# Patient Record
Sex: FEMALE | Race: BLACK OR AFRICAN AMERICAN | NOT HISPANIC OR LATINO | URBAN - METROPOLITAN AREA
[De-identification: names, ages, dates, MRNs, and addresses within clinical notes are randomized per-mention and may not be internally consistent; named-entity substitution may affect disease eponyms.]

---

## 2017-06-22 ENCOUNTER — COMMUNICATION - HEALTHEAST (OUTPATIENT)
Dept: SURGERY | Facility: CLINIC | Age: 57
End: 2017-06-22

## 2017-07-20 ENCOUNTER — COMMUNICATION - HEALTHEAST (OUTPATIENT)
Dept: SURGERY | Facility: CLINIC | Age: 57
End: 2017-07-20

## 2017-07-20 ENCOUNTER — AMBULATORY - HEALTHEAST (OUTPATIENT)
Dept: LAB | Facility: CLINIC | Age: 57
End: 2017-07-20

## 2017-07-20 ENCOUNTER — OFFICE VISIT - HEALTHEAST (OUTPATIENT)
Dept: SURGERY | Facility: CLINIC | Age: 57
End: 2017-07-20

## 2017-07-20 DIAGNOSIS — E66.01 MORBID OBESITY (H): ICD-10-CM

## 2017-07-20 DIAGNOSIS — R53.83 FATIGUE: ICD-10-CM

## 2017-07-20 DIAGNOSIS — E78.5 DYSLIPIDEMIA: ICD-10-CM

## 2017-07-20 DIAGNOSIS — M19.019 OSTEOARTHRITIS OF SHOULDER: ICD-10-CM

## 2017-07-20 LAB
CHOLEST SERPL-MCNC: 232 MG/DL
FASTING STATUS PATIENT QL REPORTED: YES
HBA1C MFR BLD: 5.6 % (ref 4.2–6.1)
HDLC SERPL-MCNC: 58 MG/DL
LDLC SERPL CALC-MCNC: 156 MG/DL
TRIGL SERPL-MCNC: 92 MG/DL

## 2017-07-20 RX ORDER — IRON ASPGLY/C/B12/FA/CA-TH/SUC 70-150-1MG
1 TABLET ORAL DAILY
Status: SHIPPED | COMMUNITY
Start: 2017-07-20

## 2017-07-20 ASSESSMENT — MIFFLIN-ST. JEOR: SCORE: 1505.76

## 2017-10-26 ENCOUNTER — AMBULATORY - HEALTHEAST (OUTPATIENT)
Dept: PHYSICAL THERAPY | Facility: REHABILITATION | Age: 57
End: 2017-10-26

## 2017-10-26 DIAGNOSIS — M54.2 CERVICALGIA: ICD-10-CM

## 2018-04-27 ENCOUNTER — COMMUNICATION - HEALTHEAST (OUTPATIENT)
Dept: SURGERY | Facility: CLINIC | Age: 58
End: 2018-04-27

## 2018-08-16 ENCOUNTER — COMMUNICATION - HEALTHEAST (OUTPATIENT)
Dept: SURGERY | Facility: CLINIC | Age: 58
End: 2018-08-16

## 2020-02-18 ENCOUNTER — AMBULATORY - HEALTHEAST (OUTPATIENT)
Dept: LAB | Facility: CLINIC | Age: 60
End: 2020-02-18

## 2020-02-18 ENCOUNTER — OFFICE VISIT - HEALTHEAST (OUTPATIENT)
Dept: SURGERY | Facility: CLINIC | Age: 60
End: 2020-02-18

## 2020-02-18 DIAGNOSIS — E66.812 CLASS 2 OBESITY WITH BODY MASS INDEX (BMI) OF 38.0 TO 38.9 IN ADULT, UNSPECIFIED OBESITY TYPE, UNSPECIFIED WHETHER SERIOUS COMORBIDITY PRESENT: ICD-10-CM

## 2020-02-18 DIAGNOSIS — E78.5 DYSLIPIDEMIA: ICD-10-CM

## 2020-02-18 LAB
25(OH)D3 SERPL-MCNC: 21.1 NG/ML (ref 30–80)
ALBUMIN SERPL-MCNC: 3.7 G/DL (ref 3.5–5)
ALP SERPL-CCNC: 70 U/L (ref 45–120)
ALT SERPL W P-5'-P-CCNC: 24 U/L (ref 0–45)
ANION GAP SERPL CALCULATED.3IONS-SCNC: 8 MMOL/L (ref 5–18)
AST SERPL W P-5'-P-CCNC: 18 U/L (ref 0–40)
BILIRUB SERPL-MCNC: 0.4 MG/DL (ref 0–1)
BUN SERPL-MCNC: 15 MG/DL (ref 8–22)
CALCIUM SERPL-MCNC: 9.3 MG/DL (ref 8.5–10.5)
CHLORIDE BLD-SCNC: 106 MMOL/L (ref 98–107)
CHOLEST SERPL-MCNC: 232 MG/DL
CO2 SERPL-SCNC: 25 MMOL/L (ref 22–31)
CREAT SERPL-MCNC: 0.7 MG/DL (ref 0.6–1.1)
ERYTHROCYTE [DISTWIDTH] IN BLOOD BY AUTOMATED COUNT: 12.6 % (ref 11–14.5)
FASTING STATUS PATIENT QL REPORTED: YES
GFR SERPL CREATININE-BSD FRML MDRD: >60 ML/MIN/1.73M2
GLUCOSE BLD-MCNC: 104 MG/DL (ref 70–125)
HBA1C MFR BLD: 5.5 % (ref 4.2–6.1)
HCT VFR BLD AUTO: 41.7 % (ref 35–47)
HDLC SERPL-MCNC: 65 MG/DL
HGB BLD-MCNC: 13.5 G/DL (ref 12–16)
LDLC SERPL CALC-MCNC: 143 MG/DL
MCH RBC QN AUTO: 29.9 PG (ref 27–34)
MCHC RBC AUTO-ENTMCNC: 32.4 G/DL (ref 32–36)
MCV RBC AUTO: 93 FL (ref 80–100)
PLATELET # BLD AUTO: 288 THOU/UL (ref 140–440)
PMV BLD AUTO: 9.5 FL (ref 8.5–12.5)
POTASSIUM BLD-SCNC: 4.4 MMOL/L (ref 3.5–5)
PROT SERPL-MCNC: 6.6 G/DL (ref 6–8)
PTH-INTACT SERPL-MCNC: 119 PG/ML (ref 10–86)
RBC # BLD AUTO: 4.51 MILL/UL (ref 3.8–5.4)
SODIUM SERPL-SCNC: 139 MMOL/L (ref 136–145)
TRIGL SERPL-MCNC: 119 MG/DL
TSH SERPL DL<=0.005 MIU/L-ACNC: 0.8 UIU/ML (ref 0.3–5)
VIT B12 SERPL-MCNC: 794 PG/ML (ref 213–816)
WBC: 4 THOU/UL (ref 4–11)

## 2020-02-18 RX ORDER — CHLORHEXIDINE GLUCONATE ORAL RINSE 1.2 MG/ML
SOLUTION DENTAL
Status: SHIPPED | COMMUNITY
Start: 2020-02-06

## 2020-02-18 ASSESSMENT — MIFFLIN-ST. JEOR: SCORE: 1532.97

## 2020-02-20 LAB — VIT B1 PYROPHOSHATE BLD-SCNC: 71 NMOL/L (ref 70–180)

## 2020-02-25 ENCOUNTER — COMMUNICATION - HEALTHEAST (OUTPATIENT)
Dept: SURGERY | Facility: CLINIC | Age: 60
End: 2020-02-25

## 2020-02-25 ENCOUNTER — AMBULATORY - HEALTHEAST (OUTPATIENT)
Dept: SURGERY | Facility: CLINIC | Age: 60
End: 2020-02-25

## 2020-02-25 DIAGNOSIS — R79.89 INCREASED PTH LEVEL: ICD-10-CM

## 2020-02-25 DIAGNOSIS — R79.89 LOW SERUM VITAMIN D: ICD-10-CM

## 2020-09-01 ENCOUNTER — COMMUNICATION - HEALTHEAST (OUTPATIENT)
Dept: SURGERY | Facility: CLINIC | Age: 60
End: 2020-09-01

## 2020-09-01 DIAGNOSIS — R79.89 LOW SERUM VITAMIN D: ICD-10-CM

## 2020-09-01 DIAGNOSIS — R79.89 INCREASED PTH LEVEL: ICD-10-CM

## 2020-09-04 ENCOUNTER — COMMUNICATION - HEALTHEAST (OUTPATIENT)
Dept: SURGERY | Facility: CLINIC | Age: 60
End: 2020-09-04

## 2020-09-08 ENCOUNTER — COMMUNICATION - HEALTHEAST (OUTPATIENT)
Dept: SURGERY | Facility: CLINIC | Age: 60
End: 2020-09-08

## 2020-09-08 ENCOUNTER — AMBULATORY - HEALTHEAST (OUTPATIENT)
Dept: LAB | Facility: CLINIC | Age: 60
End: 2020-09-08

## 2020-09-08 DIAGNOSIS — R79.89 LOW SERUM VITAMIN D: ICD-10-CM

## 2020-09-08 DIAGNOSIS — R79.89 INCREASED PTH LEVEL: ICD-10-CM

## 2020-09-08 LAB
25(OH)D3 SERPL-MCNC: 31.8 NG/ML (ref 30–80)
ALBUMIN SERPL-MCNC: 3.7 G/DL (ref 3.5–5)
ALP SERPL-CCNC: 68 U/L (ref 45–120)
ALT SERPL W P-5'-P-CCNC: 33 U/L (ref 0–45)
ANION GAP SERPL CALCULATED.3IONS-SCNC: 9 MMOL/L (ref 5–18)
AST SERPL W P-5'-P-CCNC: 22 U/L (ref 0–40)
BILIRUB SERPL-MCNC: 0.6 MG/DL (ref 0–1)
BUN SERPL-MCNC: 14 MG/DL (ref 8–22)
CALCIUM SERPL-MCNC: 9.2 MG/DL (ref 8.5–10.5)
CHLORIDE BLD-SCNC: 106 MMOL/L (ref 98–107)
CO2 SERPL-SCNC: 23 MMOL/L (ref 22–31)
CREAT SERPL-MCNC: 0.71 MG/DL (ref 0.6–1.1)
GFR SERPL CREATININE-BSD FRML MDRD: >60 ML/MIN/1.73M2
GLUCOSE BLD-MCNC: 110 MG/DL (ref 70–125)
POTASSIUM BLD-SCNC: 4.5 MMOL/L (ref 3.5–5)
PROT SERPL-MCNC: 6.9 G/DL (ref 6–8)
PTH-INTACT SERPL-MCNC: 80 PG/ML (ref 10–86)
SODIUM SERPL-SCNC: 138 MMOL/L (ref 136–145)

## 2020-09-09 ENCOUNTER — COMMUNICATION - HEALTHEAST (OUTPATIENT)
Dept: SURGERY | Facility: CLINIC | Age: 60
End: 2020-09-09

## 2020-09-15 ENCOUNTER — COMMUNICATION - HEALTHEAST (OUTPATIENT)
Dept: SURGERY | Facility: CLINIC | Age: 60
End: 2020-09-15

## 2020-09-15 ENCOUNTER — OFFICE VISIT - HEALTHEAST (OUTPATIENT)
Dept: SURGERY | Facility: CLINIC | Age: 60
End: 2020-09-15

## 2020-09-15 DIAGNOSIS — E66.9 OBESITY (BMI 30-39.9): ICD-10-CM

## 2020-09-15 ASSESSMENT — MIFFLIN-ST. JEOR: SCORE: 1478.54

## 2020-09-16 ENCOUNTER — AMBULATORY - HEALTHEAST (OUTPATIENT)
Dept: SURGERY | Facility: CLINIC | Age: 60
End: 2020-09-16

## 2020-09-16 DIAGNOSIS — E66.812 CLASS 2 OBESITY WITH BODY MASS INDEX (BMI) OF 38.0 TO 38.9 IN ADULT, UNSPECIFIED OBESITY TYPE, UNSPECIFIED WHETHER SERIOUS COMORBIDITY PRESENT: ICD-10-CM

## 2020-09-16 DIAGNOSIS — E66.01 MORBID OBESITY (H): ICD-10-CM

## 2020-09-16 RX ORDER — PHENTERMINE HYDROCHLORIDE 37.5 MG/1
TABLET ORAL
Qty: 90 TABLET | Refills: 1 | Status: SHIPPED | OUTPATIENT
Start: 2020-09-16

## 2020-10-05 ENCOUNTER — OFFICE VISIT - HEALTHEAST (OUTPATIENT)
Dept: SURGERY | Facility: CLINIC | Age: 60
End: 2020-10-05

## 2020-10-05 DIAGNOSIS — E66.9 OBESITY (BMI 30-39.9): ICD-10-CM

## 2020-12-07 ENCOUNTER — OFFICE VISIT - HEALTHEAST (OUTPATIENT)
Dept: SURGERY | Facility: CLINIC | Age: 60
End: 2020-12-07

## 2020-12-07 DIAGNOSIS — E66.9 OBESITY (BMI 30-39.9): ICD-10-CM

## 2021-02-22 ENCOUNTER — OFFICE VISIT - HEALTHEAST (OUTPATIENT)
Dept: SURGERY | Facility: CLINIC | Age: 61
End: 2021-02-22

## 2021-02-22 DIAGNOSIS — E66.9 OBESITY (BMI 30-39.9): ICD-10-CM

## 2021-02-22 ASSESSMENT — MIFFLIN-ST. JEOR: SCORE: 1478.54

## 2021-05-24 ENCOUNTER — RECORDS - HEALTHEAST (OUTPATIENT)
Dept: ADMINISTRATIVE | Facility: CLINIC | Age: 61
End: 2021-05-24

## 2021-05-26 ENCOUNTER — OFFICE VISIT - HEALTHEAST (OUTPATIENT)
Dept: SURGERY | Facility: CLINIC | Age: 61
End: 2021-05-26

## 2021-05-26 DIAGNOSIS — E66.9 OBESITY (BMI 30-39.9): ICD-10-CM

## 2021-05-27 ENCOUNTER — RECORDS - HEALTHEAST (OUTPATIENT)
Dept: ADMINISTRATIVE | Facility: CLINIC | Age: 61
End: 2021-05-27

## 2021-05-28 ENCOUNTER — RECORDS - HEALTHEAST (OUTPATIENT)
Dept: ADMINISTRATIVE | Facility: CLINIC | Age: 61
End: 2021-05-28

## 2021-05-30 ENCOUNTER — RECORDS - HEALTHEAST (OUTPATIENT)
Dept: ADMINISTRATIVE | Facility: CLINIC | Age: 61
End: 2021-05-30

## 2021-05-31 VITALS — WEIGHT: 212 LBS | BODY MASS INDEX: 37.56 KG/M2 | HEIGHT: 63 IN

## 2021-06-04 VITALS
HEIGHT: 63 IN | RESPIRATION RATE: 14 BRPM | BODY MASS INDEX: 38.62 KG/M2 | WEIGHT: 218 LBS | HEART RATE: 64 BPM | DIASTOLIC BLOOD PRESSURE: 70 MMHG | SYSTOLIC BLOOD PRESSURE: 127 MMHG | OXYGEN SATURATION: 98 %

## 2021-06-04 VITALS — HEIGHT: 63 IN | BODY MASS INDEX: 36.5 KG/M2 | WEIGHT: 206 LBS

## 2021-06-05 VITALS — BODY MASS INDEX: 36.5 KG/M2 | WEIGHT: 206 LBS | HEIGHT: 63 IN

## 2021-06-05 VITALS — BODY MASS INDEX: 36.49 KG/M2 | HEIGHT: 63 IN

## 2021-06-06 NOTE — PATIENT INSTRUCTIONS - HE
Monitor your steps with your Apple watch    [facility]    Remember to:    -Eat 3 meals a day (not 2, not 5) Chew your food well/SLOW down  -Eat your protein first  -Be a water drinker/Minize liquid calories (no regular pop, no juice) skim or 1% milk OK  -Sleep 7-8 hours each night. Address sleep if problematic  -Stress management is important. Address if problematic  -Move-8000 steps daily Muscle: maintain your muscle mass (strength training 2X/wk)  -Wheat, not white (bread, pasta, crackers, jacob, bagels, tortillas, rice)  -Limit restaurant, cafeteria, take out, drive through to 2 times per week or less  -Minimize caffeine, alcohol, and night-time snacking  -Consider keeping a food diary (i.e. My Fitness Pal, Lose It, or other food tracker)  -Follow up with the dietitian      **Some lean proteins: chicken, turkey, tuna, salmon, crab, fish, shrimp, scallops, lobster, lean cuts of beef and pork, luncheon meats, veggie burgers, beans (black, lima, garbanzo, pendleton, kidney, refried), chile, cottage cheese, string cheese, other cheese, eggs, tofu, peanut butter, nuts, vegan crumbles, greek yogurt

## 2021-06-06 NOTE — TELEPHONE ENCOUNTER
Called pt to discuss her lab results and 's recommendations. Let her a message with the info and asked her to read the message from the doctor.    Razia Bledsoe RN, CBN  [facility]  P [phone]  F [phone]

## 2021-06-06 NOTE — PROGRESS NOTES
"Bariatric Follow-up    59 y.o.  female BMI:Body mass index is 38.62 kg/m .    Weight:   Wt Readings from Last 1 Encounters:   02/18/20 218 lb (98.9 kg)    pounds  Height: 5' 3\" (1.6 m) (2/18/2020  8:39 AM)  Weight: 218 lb (98.9 kg) (2/18/2020  8:39 AM)  BMI (Calculated): 38.6 (2/18/2020  8:39 AM)  SpO2: 98 % (2/18/2020  8:39 AM)  Waist Circumference (In): 43.2 Inches (2/18/2020  8:39 AM)  Hip Circumference (In): 52.2 Inches (2/18/2020  8:39 AM)  Neck Circumference (In): 14.75 Inches (2/18/2020  8:39 AM)      Comorbidities:  Patient Active Problem List   Diagnosis     Dyslipidemia     Fatigue     Low back pain     Knee pain     Heartburn     Foot pain     Lower leg edema     Gout     Morbid obesity (H)     Urinary incontinence     Urinary urgency     Osteoarthritis of shoulder       Interim: Has been since 2017 that she followed up. \"Life Happened.\"  Working 2 jobs. New grandbaby with chromosomal abnormality.  Second job [employer] and tries to march in place. First job [employer] sitting at     Plan: Use your apple watch or phone to quantify your steps. Dietitian 1/2 phentermine in the am. [place] 5 minutes. Protein first. Labs todat  -We reviewed her medications and whether associated with weight gain.    We discussed [facility] including:  -eating 3 meals daily  -eating protein first  -eating slowly, chewing food well  -avoiding/limiting calorie containing beverages  -choosing wheat, not white with breads, crackers, pastas, jacob, bagels, tortillas, rice  -limiting restaurant or cafeteria eating to twice a week or less  -We discussed the importance of restorative sleep and stress management in maintaining a healthy weight.  -We discussed insulin resistance and glycemic index as it relates to appetite and weight control  -We discussed the National Weight Control Registry healthy weight maintenance strategies and ways to optimize metabolism.  -We discussed the " importance of physical activity including cardiovascular and strength training in maintaining a healthier weight and explored viable options.    Most recent labs:  Lab Results   Component Value Date    WBC 4.1 07/20/2017    HGB 13.6 07/20/2017    HCT 40.1 07/20/2017    MCV 92 07/20/2017     07/20/2017     Lab Results   Component Value Date    CHOL 232 (H) 07/20/2017     Lab Results   Component Value Date    HDL 58 07/20/2017     Lab Results   Component Value Date    LDLCALC 156 (H) 07/20/2017     Lab Results   Component Value Date    TRIG 92 07/20/2017       Lab Results   Component Value Date    ALT 31 07/20/2017    AST 25 07/20/2017    ALKPHOS 65 07/20/2017    BILITOT 0.8 07/20/2017     Lab Results   Component Value Date    HGBA1C 5.6 07/20/2017       Lab Results   Component Value Date    FERRITIN 112 06/25/2015     Lab Results   Component Value Date    PTH 83 06/25/2015     Lab Results   Component Value Date    25130 116 06/25/2015     Lab Results   Component Value Date    7597 64.9 06/25/2015     Lab Results   Component Value Date    TSH 0.78 06/29/2015       DIETARY HISTORY  Meals Per Day: 2  Eating Protein First?: sometimes  Food Diary: B:eggs L:all over the place, left overs D:at work-fried chicken, veggies  Snacks Per Day: not a lot  Typical Snack: chips  Fluid Intake: intentional  Portion Control: problematic  Calorie Containing Beverages: coffee with creamer and raw sugar, OJ and grapefruit juice  Alcohol per week: no  Typical Protein Food Choices: variety  Choosing Whole Grains: yes-most  Grocery Shopping is done by: herself  Food Preparation is done by: herself  Meals at Restaurant/Cafeteria/Take Out Per Week: 3  Eating at the Table: no  TV is Off During Meals: no    Positive Changes Since Last Visit: ready to get back on track  Struggling With: exercise, time, stress (a little better)    Knowledgeable in Reading Food Labels: yes  Getting Adequate Protein: yes  Sleeping 7-8 hours/day 11pm-6 am  "catches up on Thursdays and weekends  Stress management much better    PHYSICAL ACTIVITY PATTERNS:  Cardiovascular: walking, 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  Strength Training: none    REVIEW OF SYSTEMS  GENERAL/CONSTITUTIONAL:  Fatigue: yes  CARDIOVASCULAR:  Chest Pain with Exertion: no  PULMONARY:  Dyspnea on exertion: yes  CPAP Use: no  Tobacco Use: no  Asthma Controlled: NA  GASTROINTESTINAL:  GERD/Heartburn: no  Gallbladder:   UROLOGIC:  Urinary Symptoms: urgency  NEUROLOGIC:  Headaches: no  Paresthesias: no  PSYCHIATRIC:  Moods: OK  MUSCULOSKELETAL/RHEUMATOLOGIC  Arthralgias: knee  Myalgias: LBP  ENDOCRINE:  Monitoring Blood Sugars: no  Sugars Well Controlled: yes  DERMATOLOGIC:  Rashes: no    PHYSICAL EXAM:  Vitals: /70   Pulse 64   Resp 14   Ht 5' 3\" (1.6 m)   Wt 218 lb (98.9 kg)   SpO2 98%   BMI 38.62 kg/m    Height: 5' 3\" (1.6 m) (2/18/2020  8:39 AM)  Weight: 218 lb (98.9 kg) (2/18/2020  8:39 AM)  BMI (Calculated): 38.6 (2/18/2020  8:39 AM)  SpO2: 98 % (2/18/2020  8:39 AM)  Waist Circumference (In): 43.2 Inches (2/18/2020  8:39 AM)  Hip Circumference (In): 52.2 Inches (2/18/2020  8:39 AM)  Neck Circumference (In): 14.75 Inches (2/18/2020  8:39 AM)      GEN: Pleasant, well groomed, in no acute distress  EYES: EOMI,  ENT: airway patent  NECK: no carotid bruits, no anterior/supraclavicular lymphadenopathy, thyroid normal   HEART: Rhythm regular, rate regular, no murmur   LUNGS: Clear  ABDOMEN: soft, non-tender, obese, no rashes   VASCULAR: no  lower extremity edema  MUSCULOSKELETAL:  muscle mass OK  SKIN:  no color changes of venous stasis, no ulcerations    Time spent with patients 30 minutes, >50% in counseling and coordination of care.        "

## 2021-06-07 NOTE — TELEPHONE ENCOUNTER
Called pt again to make sure she gets 's message and left her another message with the information.    Razia Bledsoe RN, CBN  [facility]  P [phone]  F [phone]

## 2021-06-11 NOTE — TELEPHONE ENCOUNTER
----- Message from Nicole Miles MD sent at 2/25/2020  5:57 PM CST -----  Regarding: recheck labs  PTH, D, CMP in 6 months. Thanks

## 2021-06-11 NOTE — PROGRESS NOTES
"Monisha Lee is a 60 y.o. female who is being evaluated via a billable telephone visit.      The patient has been notified of following:     \"This telephone visit will be conducted via a call between you and your physician/provider. We have found that certain health care needs can be provided without the need for a physical exam.  This service lets us provide the care you need with a short phone conversation.  If a prescription is necessary we can send it directly to your pharmacy.  If lab work is needed we can place an order for that and you can then stop by our lab to have the test done at a later time.    If during the course of the call the physician/provider feels a telephone visit is not appropriate, you will not be charged for this service.\"     Monisha Lee complains of    Chief Complaint   Patient presents with     Nutrition Counseling       I have reviewed and updated the patient's Past Medical History, Social History, Family History and Medication List.    ALLERGIES  Patient has no known allergies.    Additional provider notes:     Medical  Weight Loss Initial Diet Evaluation  Monisha is presenting today for a new weight management nutrition consultation. Pt has had an initial appointment with Dr. Miles.  Weight loss medication: Phentermine.   Nutrition Assessment:   Anthropometrics:  Pt's Weight: 206 lb (93.4 kg)    BMI:   Vitals:    09/15/20 1100   Weight: 206 lb (93.4 kg)      IBW: 115-125 lbs  Estimated RMR ([place]-St Jeor equation): 1485 kcals x 1.3 (light active) = 1931 kcals (for weight maintenance)  Recommended Protein Intake:  grams of protein/day  Medical History:  Patient Active Problem List   Diagnosis     Dyslipidemia     Fatigue     Low back pain     Knee pain     Heartburn     Foot pain     Lower leg edema     Gout     Morbid obesity (H)     Urinary incontinence     Urinary urgency     Osteoarthritis of shoulder      Nutrition History:   Vitamins/Mineral Supplementation: Vitamin D3, " Multivitamin  Dietary Recall:  Trying to make sure she gets breakfast, has been focusing on getting vegetables in, and uses a salt   Breakfast: boiled eggs, and toast  Lunch: protein + sides  Dinner: protein + sides  Snacks: Occasionally snacks on chips  Hydration (type/oz. per day):  Water: 18 oz bottle plus some  Caffeine: 1/2 cup coffee with creamer and zero calorie sugar  Juice: Orange juice - adds ACV   Alcohol : White Claw - has a few over the weekend  Exercise:  Routine exercise established: working on this  She is getting back into walking and trying to be more active; plans to do walking over lunch at work  Nutrition Diagnosis (PES statement):   Overweight/Obesity (NC 3.3) related to excessive calorie intake as evidenced by  BMI 36.49  Nutrition Intervention:  1. Food and/or Nutrient Delivery   a. Placed emphasis on importance of developing a healthy meal routine, aiming for 3 meals a day and no snacks.  2. Nutrition Education   a. Discussed with patient how to build a meal: the importance of including a lean/low fat protein at each meal, include a source of vegetables at a minimum of lunch and dinner and limiting carbohydrate intake.  b. Educated on sources of lean protein, portion sizes, the amount of grams found in each source. Recommend patient to aim for 30 g protein at each meal.  c. Discussed the importance of adequate hydration, with emphasis on drinking 64oz of water or zero calorie beverages per day.  3. Nutrition Counseling   a. Encouraged importance of developing routine exercise for health benefits and weight loss.  Goals established by patient:   1. Aim to drink at least one 24 oz cup of water per day  2. Walking exercise during lunch break 5 days per week  3. Aim to have about 20-30 g of protein at meals or 3-4 oz of protein per serving  Assessment/Plan:  1. Obesity (BMI 30-39.9)    Pt will follow up in 1 month(s) with dietitian.     Phone call duration: 30 minutes    Rosemarie Varghese RD

## 2021-06-11 NOTE — PROGRESS NOTES
"Bariatric Follow-up    57 y.o.  female BMI:Body mass index is 37.55 kg/(m^2).    Weight:   Wt Readings from Last 1 Encounters:   17 212 lb (96.2 kg)    pounds  Height: 5' 3\" (1.6 m) (2017  8:16 AM)  Initial Weight: 212 lbs (2017  8:16 AM)  Weight: 212 lb (96.2 kg) (2017  8:16 AM)  Weight loss from initial: 0 (2017  8:16 AM)  % Weight loss: 0 % (2017  8:16 AM)  BMI (Calculated): 37.6 (2017  8:16 AM)  SpO2: 100 % (2017  8:16 AM)  Waist Circumference (In): 46 Inches (2017  8:16 AM)  Hip Circumference (In): 51 Inches (2017  8:16 AM)  Neck Circumference (In): 15 Inches (2017  8:16 AM)    Comorbidities:  Patient Active Problem List   Diagnosis     Dyslipidemia     Fatigue     Low back pain     Knee pain     Heartburn     Foot pain     Lower leg edema     Gout     Morbid obesity     Urinary incontinence     Urinary urgency     Osteoarthritis of shoulder       Interim: Has remained stable in weight over the past 2 years. Did well on phentermine. Her father  1 year ago today.  Her son is in the dental hygienist program at the [employer].  She is walking on her lunch. She got a second job in the evening. She is working 7 days a week. Sleep deprived. Doing well overall.    Plan: Restart phentermine. Dietitian, 7 minute workout in the morning. Continue walking daily.  -We reviewed her medications and whether associated with weight gain.    We discussed [facility] including:  -eating 3 meals daily  -eating protein first  -eating slowly, chewing food well  -avoiding/limiting calorie containing beverages  -choosing wheat, not white with breads, crackers, pastas, jacob, bagels, tortillas, rice  -limiting restaurant or cafeteria eating to twice a week or less  -We discussed the importance of restorative sleep and stress management in maintaining a healthy weight.  -We discussed insulin resistance and glycemic index as it relates to appetite and weight " control  -We discussed the National Weight Control Registry healthy weight maintenance strategies and ways to optimize metabolism.  -We discussed the importance of physical activity including cardiovascular and strength training in maintaining a healthier weight and explored viable options.    Most recent labs:  No results found for: WBC, HGB, HCT, MCV, PLT  Lab Results   Component Value Date    CHOL 246 (H) 10/06/2016     Lab Results   Component Value Date    HDL 58 10/06/2016     Lab Results   Component Value Date    LDLCALC 153 (H) 10/06/2016     Lab Results   Component Value Date    TRIG 177 (H) 10/06/2016     No components found for: CHOLHDL  Lab Results   Component Value Date    ALT 27 06/29/2015    AST 18 06/29/2015    ALKPHOS 80 06/29/2015    BILITOT 0.3 06/29/2015     Lab Results   Component Value Date    HGBA1C 5.5 06/25/2015     No results found for: [id number]  No results found for: [id number]  Lab Results   Component Value Date    FERRITIN 112 06/25/2015     Lab Results   Component Value Date    PTH 83 06/25/2015     Lab Results   Component Value Date    48119 116 06/25/2015     Lab Results   Component Value Date    7597 64.9 06/25/2015     Lab Results   Component Value Date    TSH 0.78 06/29/2015     No results found for: TESTOSTERONE    DIETARY HISTORY  Meals Per Day: 3  Eating Protein First?: not really  Food Diary: B:boiled eggs, fried eggs, WW toast L:salads now-out with her co-workers 4 days or leftovers D:fish or chicken  Snacks Per Day: protein snacks-nuts, cheese, yogurt with nuts  Typical Snack: see above  Fluid Intake: intentional  Portion Control: stable  Calorie Containing Beverages: grapefruit juice, OJ, [place] Tea (SF)  Alcohol per week: 0-2 (4th of July)  Typical Protein Food Choices: fish, chicken, yogurt, nuts, eggs, cheese  Choosing Whole Grains: yes  Grocery Shopping is done by: she does  Food Preparation is done by: she does  Meals at Restaurant/Cafeteria/Take Out Per Week: 4 at  "work  Eating at the Table: yes  TV is Off During Meals: no    Positive Changes Since Last Visit: maintains her weight  Struggling With: weight loss/sleep    Knowledgeable in Reading Food Labels: yes/needs refresher  Getting Adequate Protein: yes  Sleeping 7-8 hours/day no  Stress management OK    PHYSICAL ACTIVITY PATTERNS:  Cardiovascular: walking  Strength Training: 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     REVIEW OF SYSTEMS  GENERAL/CONSTITUTIONAL:  Fatigue: yes  CARDIOVASCULAR:  Chest Pain with Exertion: no  PULMONARY:  Dyspnea on exertion: a little  CPAP Use: NA-sleep study was negative  Tobacco Use: no  Asthma Controlled: NA  GASTROINTESTINAL:  GERD/Heartburn: no  Gallbladder:   UROLOGIC:  Urinary Symptoms: no  NEUROLOGIC:  Headaches: no  Paresthesias: no  PSYCHIATRIC:  Moods: good  MUSCULOSKELETAL/RHEUMATOLOGIC  Arthralgias: LBP  Myalgias: LBP  ENDOCRINE:  Monitoring Blood Sugars: no  Sugars Well Controlled: yes  DERMATOLOGIC:  Rashes: no    PHYSICAL EXAM:  Vitals: /68 (Patient Site: Right Arm, Patient Position: Sitting, Cuff Size: Adult Large)  Pulse 75  Temp 98.9  F (37.2  C) (Oral)   Resp 18  Ht 5' 3\" (1.6 m)  Wt 212 lb (96.2 kg)  SpO2 100%  BMI 37.55 kg/m2  Height: 5' 3\" (1.6 m) (7/20/2017  8:16 AM)  Initial Weight: 212 lbs (7/20/2017  8:16 AM)  Weight: 212 lb (96.2 kg) (7/20/2017  8:16 AM)  Weight loss from initial: 0 (7/20/2017  8:16 AM)  % Weight loss: 0 % (7/20/2017  8:16 AM)  BMI (Calculated): 37.6 (7/20/2017  8:16 AM)  SpO2: 100 % (7/20/2017  8:16 AM)  Waist Circumference (In): 46 Inches (7/20/2017  8:16 AM)  Hip Circumference (In): 51 Inches (7/20/2017  8:16 AM)  Neck Circumference (In): 15 Inches (7/20/2017  8:16 AM)    GEN: Pleasant, well groomed, in no acute disress  EYES: EOMI,  ENT: airway patent  NECK: no carotid bruits, no anterior/supraclavicular lymphadenopathy, thyroid normal   HEART: Rhythm regular, rate regular, no murmur   LUNGS: Clear  ABDOMEN: soft, non-tender, obese, " no rashes   VASCULAR: trace  lower extremity edema  MUSCULOSKELETAL:  muscle mass good for age  SKIN:  no color changes of venous stasis, no ulcerations    Time spent with patients 30 minutes, >50% in counseling and coordination of care.

## 2021-06-12 NOTE — PROGRESS NOTES
"Monisha Lee is a 60 y.o. female who is being evaluated via a billable telephone visit.      The patient has been notified of following:     \"This telephone visit will be conducted via a call between you and your physician/provider. We have found that certain health care needs can be provided without the need for a physical exam.  This service lets us provide the care you need with a short phone conversation.  If a prescription is necessary we can send it directly to your pharmacy.  If lab work is needed we can place an order for that and you can then stop by our lab to have the test done at a later time.    If during the course of the call the physician/provider feels a telephone visit is not appropriate, you will not be charged for this service.\"     Monisha Lee complains of    Chief Complaint   Patient presents with     Nutrition Counseling       I have reviewed and updated the patient's Past Medical History, Social History, Family History and Medication List.    ALLERGIES  Patient has no known allergies.    Additional provider notes:       Medical  Weight Loss Follow-Up Diet Evaluation  Assessment:  Monisha is presenting today for a follow up weight management nutrition consultation. Pt has had an initial appointment with Dr. Miles  Weight loss medication: Phentermine.   Pt's Weight: 206 lb (93.4 kg)    BMI: There is no height or weight on file to calculate BMI.  IBW: 115-125 lbs    Estimated RMR ([place]-St Jeor equation): 1485 kcals x 1.3 (light active) = 1931 kcals (for weight maintenance)  Recommended Protein Intake:  grams of protein/day  Patient Active Problem List:  Patient Active Problem List   Diagnosis     Dyslipidemia     Fatigue     Low back pain     Knee pain     Heartburn     Foot pain     Lower leg edema     Gout     Morbid obesity (H)     Urinary incontinence     Urinary urgency     Osteoarthritis of shoulder     Diabetes: No    Progress on goals from last visit:     1. Aim to drink at " least one 24 oz cup of water per day - met   2. Walking exercise during lunch break 5 days per week - not, 2x per week  3. Aim to have about 20-30 g of protein at meals or 3-4 oz of protein per serving    Dietary Recall:  Did not review this visit  Hydration (type/oz. per day):  Water: More than 24 oz   Caffeine: 1/2 cup coffee with creamer and zero calorie sugar  Juice: Orange juice - adds ACV   Alcohol : White Claw - has a few over the weekend    Exercise:  Routine exercise established: Yes  Going to start doing pilates exercise when too cold to walk outside. Is currently walking outside about 2x per week. She plans to do more this week.    Nutrition Diagnosis:    Overweight/Obesity (NC 3.3) related to excessive calorie intake as evidenced by  BMI 36.5    Intervention:  1. Food and/or nutrient delivery: Continue eating 3x per day with a protein source alongside other nutrient dense foods.  2. Nutrition counseling: Encouraged patient to continue maintaining other new habits such as increased water and protein intake    Monitoring/Evaluation:    Goals:  1. Aim to drink at least one 24 oz cup of water per day  2. Walking exercise during lunch break 5 days per week  3. Aim to have about 20-30 g of protein at meals or 3-4 oz of protein per serving    Assessment/Plan:  1. Obesity (BMI 30-39.9)     Phone call duration: 11 minutes    Patient to follow up in 2 month(s) with PRIYA Varghese RD

## 2021-06-13 NOTE — PROGRESS NOTES
"   Monisha Lee is a 60 y.o. female who is being evaluated via a billable video visit.      The patient has been notified of following:     \"This video visit will be conducted via a call between you and your physician/provider. We have found that certain health care needs can be provided without the need for an in-person physical exam.  This service lets us provide the care you need with a video conversation.  If a prescription is necessary we can send it directly to your pharmacy.  If lab work is needed we can place an order for that and you can then stop by our lab to have the test done at a later time.    Video visits are billed at different rates depending on your insurance coverage. Please reach out to your insurance provider with any questions.    If during the course of the call the physician/provider feels a video visit is not appropriate, you will not be charged for this service.\"    Patient has given verbal consent to a Video visit? Yes  How would you like to obtain your AVS? AVS Preference: MyChart.  If dropped by the video visit, the video invitation should be sent to: Send to e-mail at: [email or web address]  Will anyone else be joining your video visit? No        Video Start Time: 11:00 am    Medical  Weight Loss Follow-Up Diet Evaluation  Assessment:  Monisha is presenting today for a follow up weight management nutrition consultation. Pt has had an initial appointment with Dr. Miles.  Weight loss medication: Phentermine.  Pt's No data recorded  BMI: Body mass index is 36.49 kg/m .  Patient weight not recorded   She has not weighed herself recently. But she does feel like she has lost weight and notes that her clothes are looser.    Estimated RMR (Caswell-St Jeor equation): 1485 kcals x 1.3 (light active) = 1931 kcals (for weight maintenance)  Recommended Protein Intake: 83-93 grams of protein/day  Patient Active Problem List:  Patient Active Problem List   Diagnosis     Dyslipidemia     Fatigue     Low " back pain     Knee pain     Heartburn     Foot pain     Lower leg edema     Gout     Morbid obesity (H)     Urinary incontinence     Urinary urgency     Osteoarthritis of shoulder     Diabetes: No    Progress on goals from last visit:  1. Aim to drink at least one 24 oz cup of water per day- Met, 3-4 bottles (16.9 oz)  2. Walking exercise during lunch break 5 days per week - not met, has switched to exercise videos but not doing 5 days per week  3. Aim to have about 20-30 g of protein at meals or 3-4 oz of protein per serving - Met    Dietary Recall:  Breakfast: 3 Eggs and usually throws away yolk  (boiled or scrambled) as a sandwich or sometimes with a meat  Lunch: Light to medium size dinner  Dinner: Light dinner like sandwich or soup  Beverages:   3-4 bottles of water  She reports she is drinking White claw instead of beer  [place] Tea  Exercise:   Is doing one of her exercise videos 1-2x per week  Nutrition Diagnosis:    Overweight/Obesity (NC 3.3) related to excessive calorie intake as evidenced by  BMI 36.5  Intervention:  1. Food and/or nutrient delivery: Continue eating 3x per day with a protein source alongside other nutrient dense foods.  2. Nutrition counseling: Reviewed current goals. Encouraged patient to continue maintaining other new habits such as increased water and protein intake    Monitoring/Evaluation:    Goals:  1. Aim to drink at least 3-4 bottles (16.9 oz) per day  2. Continue exercise routine of walking or video exercises.  3. Aim to have about 20-30 g of protein at meals or 3-4 oz of protein per serving    Patient to follow up in 3 month(s) with PRIYA    Video-Visit Details    Type of service:  Video Visit    Video End Time (time video stopped): 11:11 am  Originating Location (pt. Location): Home    Distant Location (provider location):  [facility] [place]     Platform used for Video Visit: Elmo Varghese RD

## 2021-06-15 NOTE — PROGRESS NOTES
"   Monisha Lee is a 60 y.o. female who is being evaluated via a billable telephone visit.      The patient has been notified of following:     \"This telephone visit will be conducted via a call between you and your physician/provider. We have found that certain health care needs can be provided without the need for a physical exam.  This service lets us provide the care you need with a short phone conversation.  If a prescription is necessary we can send it directly to your pharmacy.  If lab work is needed we can place an order for that and you can then stop by our lab to have the test done at a later time.    Telephone visits are billed at different rates depending on your insurance coverage. During this emergency period, for some insurers they may be billed the same as an in-person visit.  Please reach out to your insurance provider with any questions.    If during the course of the call the physician/provider feels a telephone visit is not appropriate, you will not be charged for this service.\"    Patient has given verbal consent to a Telephone visit? Yes    Patient would like to receive their AVS by AVS Preference: Juanito.    Additional provider notes:      Medical  Weight Loss Follow-Up Diet Evaluation  Assessment:  Monisha is presenting today for a follow up weight management nutrition consultation. Pt has had an initial appointment with Dr. Miles.  Weight loss medication: Phentermine.  Pt's No data recorded  BMI: There is no height or weight on file to calculate BMI.  Patient weight not recorded    Estimated RMR (Montgomery-St Jeor equation): 1485 kcals x 1.3 (light active) = 1931 kcals (for weight maintenance)  Recommended Protein Intake: 83-93 grams of protein/day  Patient Active Problem List:  Patient Active Problem List   Diagnosis     Dyslipidemia     Fatigue     Low back pain     Knee pain     Heartburn     Foot pain     Lower leg edema     Gout     Morbid obesity (H)     Urinary incontinence     Urinary " urgency     Osteoarthritis of shoulder     Diabetes: No    Progress on goals from last visit:   1. Aim to drink at least 3-4 bottles (16.9 oz) per day - met - Met   2. Continue exercise routine of walking or video exercises. - met   3. Aim to have about 20-30 g of protein at meals or 3-4 oz of protein per serving - met    Dietary Recall:  She continues to eat 3x per day with a protein source at each meal. She states that she needs to increase her vegetable intake.  Beverages:   3-4 bottles of water  She reports she is drinking White claw instead of beer  Exercise:   30 minutes of exercise and also has a stand up desk at work  Nutrition Diagnosis:    Overweight/Obesity (NC 3.3) related to excessive calorie intake as evidenced by  BMI 36.5    Intervention:  1. Food and/or nutrient delivery: Continue eating 3x per day with a protein source alongside other nutrient dense foods.  2. Nutrition counseling: Reviewed current goals. Encouraged patient to continue maintaining other new habits such as increased water and protein intake    Monitoring/Evaluation:    Goals:  1. Aim to drink at least 3-4 bottles (16.9 oz) per day  2. Continue exercise routine of walking or video exercises.  3. Aim to have about 20-30 g of protein at meals or 3-4 oz of protein per serving  4. Increase vegetable intake.    Patient to follow up in 3 month(s) with RD    Phone call duration: 15 minutes    Rosemarie Varghese RD

## 2021-06-16 PROBLEM — M19.019 OSTEOARTHRITIS OF SHOULDER: Status: ACTIVE | Noted: 2017-07-20

## 2021-06-25 NOTE — PROGRESS NOTES
"   Monisha Lee is a 61 y.o. female who is being evaluated via a billable telephone visit.      The patient has been notified of following:     \"This telephone visit will be conducted via a call between you and your physician/provider. We have found that certain health care needs can be provided without the need for a physical exam.  This service lets us provide the care you need with a short phone conversation.  If a prescription is necessary we can send it directly to your pharmacy.  If lab work is needed we can place an order for that and you can then stop by our lab to have the test done at a later time.    Telephone visits are billed at different rates depending on your insurance coverage. During this emergency period, for some insurers they may be billed the same as an in-person visit.  Please reach out to your insurance provider with any questions.    If during the course of the call the physician/provider feels a telephone visit is not appropriate, you will not be charged for this service.\"    Patient has given verbal consent to a Telephone visit? Yes    Patient would like to receive their AVS by AVS Preference: Juanito.    Additional provider notes:      Medical  Weight Loss Follow-Up Diet Evaluation  Assessment:  Monisha is presenting today for a follow up weight management nutrition consultation. Pt has had an initial appointment with Dr. Miles  Weight loss medication: Phentermine.   Pt's No data recorded  BMI: There is no height or weight on file to calculate BMI.  Patient weight not recorded    Estimated RMR (Tyler-St Jeor equation): 1485 kcals x 1.3 (light active) = 1931 kcals (for weight maintenance)  Recommended Protein Intake: 83-93 grams of protein/day  Patient Active Problem List:  Patient Active Problem List   Diagnosis     Dyslipidemia     Fatigue     Low back pain     Knee pain     Heartburn     Foot pain     Lower leg edema     Gout     Morbid obesity (H)     Urinary incontinence     Urinary " urgency     Osteoarthritis of shoulder     Diabetes: No    Progress on goals from last visit: was off track for a couple weeks due to loss in the family  1. Aim to drink at least 3-4 bottles (16.9 oz) per day - met  2. Continue exercise routine of walking or video exercises - met, everyday   3. Aim to have about 20-30 g of protein at meals or 3-4 oz of protein per serving - met  4. Increase vegetable intake. - met     Dietary Intake:  She is getting back on track with her intake after having a few losses in her family. She maintains good water intake, protein at each meal and at least one vegetable serving per day.  Beverages:   3-4 bottles of water  Exercise:   30 minutes of walking and is wanting to increase.   Nutrition Diagnosis:    Overweight/Obesity (NC 3.3) related to excessive calorie intake as evidenced by  BMI 36.5  Intervention:  1. Food and/or nutrient delivery: Continue eating 3x per day with a protein source alongside other nutrient dense foods.  2. Nutrition counseling: Reviewed current habits    Monitoring/Evaluation:    Goals:  1. Aim to drink at least 3-4 bottles (16.9 oz) per day  2.  Continue exercise routine of walking or video exercises.  3. Aim to have about 20-30 g of protein at meals or 3-4 oz of protein per serving  4. Increase vegetable intake.    Patient to follow up in 2 month(s) with RD    Phone call duration: 8 minutes    Rosemarie Vraghese RD

## 2021-07-13 ENCOUNTER — RECORDS - HEALTHEAST (OUTPATIENT)
Dept: ADMINISTRATIVE | Facility: CLINIC | Age: 61
End: 2021-07-13

## 2021-07-23 ENCOUNTER — RECORDS - HEALTHEAST (OUTPATIENT)
Dept: ADMINISTRATIVE | Facility: CLINIC | Age: 61
End: 2021-07-23

## 2021-07-26 ENCOUNTER — TELEPHONE (OUTPATIENT)
Dept: SURGERY | Facility: CLINIC | Age: 61
End: 2021-07-26

## 2021-07-26 NOTE — TELEPHONE ENCOUNTER
Called patient for nutrition phone appointment. She answered but forgot about the appointment and needs to reschedule. She will call when she has time to reschedule.    Rosemarie Varghese RD, LD

## 2021-08-07 ENCOUNTER — HEALTH MAINTENANCE LETTER (OUTPATIENT)
Age: 61
End: 2021-08-07

## 2021-10-02 ENCOUNTER — HEALTH MAINTENANCE LETTER (OUTPATIENT)
Age: 61
End: 2021-10-02

## 2022-09-03 ENCOUNTER — HEALTH MAINTENANCE LETTER (OUTPATIENT)
Age: 62
End: 2022-09-03

## 2023-09-30 ENCOUNTER — HEALTH MAINTENANCE LETTER (OUTPATIENT)
Age: 63
End: 2023-09-30

## 2024-05-07 PROBLEM — I16.0 HYPERTENSIVE URGENCY: Status: ACTIVE | Noted: 2022-04-13

## 2024-05-07 PROBLEM — I10 ESSENTIAL HYPERTENSION: Status: ACTIVE | Noted: 2023-03-21

## 2024-05-07 PROBLEM — M19.012 ARTHRITIS OF BOTH GLENOHUMERAL JOINTS: Status: ACTIVE | Noted: 2021-01-27

## 2024-05-07 PROBLEM — N28.89 RENAL MASS: Status: ACTIVE | Noted: 2021-12-27

## 2024-05-07 PROBLEM — G56.22 GUYON SYNDROME, LEFT: Status: ACTIVE | Noted: 2023-02-01

## 2024-05-07 PROBLEM — F43.10 PTSD (POST-TRAUMATIC STRESS DISORDER): Status: ACTIVE | Noted: 2023-05-09

## 2024-05-07 PROBLEM — D12.6 ADENOMATOUS POLYP OF COLON: Status: ACTIVE | Noted: 2019-11-29

## 2024-05-07 PROBLEM — G89.29 CHRONIC PAIN OF BOTH SHOULDERS: Status: ACTIVE | Noted: 2021-01-27

## 2024-05-07 PROBLEM — F43.23 ADJUSTMENT DISORDER WITH MIXED ANXIETY AND DEPRESSED MOOD: Status: ACTIVE | Noted: 2023-04-18

## 2024-05-07 PROBLEM — M19.011 ARTHRITIS OF BOTH GLENOHUMERAL JOINTS: Status: ACTIVE | Noted: 2021-01-27

## 2024-05-07 PROBLEM — K43.2 INCISIONAL HERNIA, WITHOUT OBSTRUCTION OR GANGRENE: Status: ACTIVE | Noted: 2021-11-16

## 2024-05-07 PROBLEM — M25.512 CHRONIC PAIN OF BOTH SHOULDERS: Status: ACTIVE | Noted: 2021-01-27

## 2024-05-07 PROBLEM — F43.20 ADJUSTMENT REACTION: Status: ACTIVE | Noted: 2023-05-08

## 2024-05-07 PROBLEM — Z63.4 GRIEF AT LOSS OF CHILD: Status: ACTIVE | Noted: 2023-05-08

## 2024-05-07 PROBLEM — G56.02 CARPAL TUNNEL SYNDROME OF LEFT WRIST: Status: ACTIVE | Noted: 2023-02-01

## 2024-05-07 PROBLEM — M25.511 CHRONIC PAIN OF BOTH SHOULDERS: Status: ACTIVE | Noted: 2021-01-27

## 2024-05-07 PROBLEM — F43.21 GRIEF AT LOSS OF CHILD: Status: ACTIVE | Noted: 2023-05-08

## 2024-05-07 PROBLEM — G47.00 INSOMNIA: Status: ACTIVE | Noted: 2023-06-09

## 2024-05-07 PROBLEM — R51.9 INTRACTABLE HEADACHE: Status: ACTIVE | Noted: 2022-04-13

## 2024-05-07 PROBLEM — R41.3 GLOBAL AMNESIA: Status: ACTIVE | Noted: 2023-05-08
